# Patient Record
Sex: FEMALE | Race: WHITE | NOT HISPANIC OR LATINO | Employment: OTHER | URBAN - METROPOLITAN AREA
[De-identification: names, ages, dates, MRNs, and addresses within clinical notes are randomized per-mention and may not be internally consistent; named-entity substitution may affect disease eponyms.]

---

## 2017-10-20 ENCOUNTER — ESTABLISHED COMPREHENSIVE EXAM (OUTPATIENT)
Dept: URBAN - METROPOLITAN AREA CLINIC 39 | Facility: CLINIC | Age: 82
End: 2017-10-20

## 2017-10-20 DIAGNOSIS — H43.813: ICD-10-CM

## 2017-10-20 DIAGNOSIS — Z96.1: ICD-10-CM

## 2017-10-20 DIAGNOSIS — H35.372: ICD-10-CM

## 2017-10-20 PROCEDURE — 1036F TOBACCO NON-USER: CPT

## 2017-10-20 PROCEDURE — 92014 COMPRE OPH EXAM EST PT 1/>: CPT

## 2017-10-20 PROCEDURE — G8785 BP SCRN NO PERF AT INTERVAL: HCPCS

## 2017-10-20 PROCEDURE — 92015 DETERMINE REFRACTIVE STATE: CPT

## 2017-10-20 PROCEDURE — G8428 CUR MEDS NOT DOCUMENT: HCPCS

## 2017-10-20 ASSESSMENT — VISUAL ACUITY
OD_CC: 20/25
OS_SC: 20/100
OD_SC: J8
OD_CC: J1
OU_SC: J2
OD_SC: 20/20
OS_CC: 20/30-2
OS_SC: J3

## 2017-10-20 ASSESSMENT — TONOMETRY
OS_IOP_MMHG: 10
OD_IOP_MMHG: 10

## 2018-02-13 ENCOUNTER — ESTABLISHED PATIENT (OUTPATIENT)
Dept: URBAN - METROPOLITAN AREA CLINIC 39 | Facility: CLINIC | Age: 83
End: 2018-02-13

## 2018-02-13 DIAGNOSIS — H35.3131: ICD-10-CM

## 2018-02-13 DIAGNOSIS — H35.372: ICD-10-CM

## 2018-02-13 DIAGNOSIS — H43.811: ICD-10-CM

## 2018-02-13 PROCEDURE — G8427 DOCREV CUR MEDS BY ELIG CLIN: HCPCS

## 2018-02-13 PROCEDURE — 1036F TOBACCO NON-USER: CPT

## 2018-02-13 PROCEDURE — 9222550 BILAT EXTENDED OPHTHALMOSCOPY, FIRST

## 2018-02-13 PROCEDURE — 2019F DILATED MACUL EXAM DONE: CPT

## 2018-02-13 PROCEDURE — 4177F TALK PT/CRGVR RE AREDS PREV: CPT

## 2018-02-13 PROCEDURE — 92014 COMPRE OPH EXAM EST PT 1/>: CPT

## 2018-02-13 PROCEDURE — 92134 CPTRZ OPH DX IMG PST SGM RTA: CPT

## 2018-02-13 ASSESSMENT — VISUAL ACUITY
OS_CC: 20/40+2
OD_CC: 20/20-1
OS_CC: J1
OD_CC: J1

## 2018-02-13 ASSESSMENT — TONOMETRY
OS_IOP_MMHG: 11
OD_IOP_MMHG: 10

## 2018-08-14 ENCOUNTER — ESTABLISHED COMPREHENSIVE EXAM (OUTPATIENT)
Dept: URBAN - METROPOLITAN AREA CLINIC 39 | Facility: CLINIC | Age: 83
End: 2018-08-14

## 2018-08-14 DIAGNOSIS — H35.363: ICD-10-CM

## 2018-08-14 DIAGNOSIS — H35.3131: ICD-10-CM

## 2018-08-14 DIAGNOSIS — H43.811: ICD-10-CM

## 2018-08-14 DIAGNOSIS — H35.372: ICD-10-CM

## 2018-08-14 PROCEDURE — G8785 BP SCRN NO PERF AT INTERVAL: HCPCS

## 2018-08-14 PROCEDURE — G8427 DOCREV CUR MEDS BY ELIG CLIN: HCPCS

## 2018-08-14 PROCEDURE — 2019F DILATED MACUL EXAM DONE: CPT

## 2018-08-14 PROCEDURE — 1036F TOBACCO NON-USER: CPT

## 2018-08-14 PROCEDURE — G9903 PT SCRN TBCO ID AS NON USER: HCPCS

## 2018-08-14 PROCEDURE — 92134 CPTRZ OPH DX IMG PST SGM RTA: CPT

## 2018-08-14 PROCEDURE — 4177F TALK PT/CRGVR RE AREDS PREV: CPT

## 2018-08-14 PROCEDURE — 9222650 BILAT EXTENDED OPHTHALMOSCOPY, F/U

## 2018-08-14 PROCEDURE — 92014 COMPRE OPH EXAM EST PT 1/>: CPT

## 2018-08-14 ASSESSMENT — VISUAL ACUITY
OD_CC: 20/30
OS_CC: 20/25

## 2018-08-14 ASSESSMENT — TONOMETRY
OS_IOP_MMHG: 12
OD_IOP_MMHG: 11

## 2018-12-26 ENCOUNTER — ESTABLISHED COMPREHENSIVE EXAM (OUTPATIENT)
Dept: URBAN - METROPOLITAN AREA CLINIC 39 | Facility: CLINIC | Age: 83
End: 2018-12-26

## 2018-12-26 DIAGNOSIS — H16.223: ICD-10-CM

## 2018-12-26 DIAGNOSIS — H43.811: ICD-10-CM

## 2018-12-26 DIAGNOSIS — H35.363: ICD-10-CM

## 2018-12-26 DIAGNOSIS — M35.00: ICD-10-CM

## 2018-12-26 DIAGNOSIS — H35.372: ICD-10-CM

## 2018-12-26 DIAGNOSIS — H35.3131: ICD-10-CM

## 2018-12-26 PROCEDURE — 2019F DILATED MACUL EXAM DONE: CPT

## 2018-12-26 PROCEDURE — 92015 DETERMINE REFRACTIVE STATE: CPT

## 2018-12-26 PROCEDURE — G9903 PT SCRN TBCO ID AS NON USER: HCPCS

## 2018-12-26 PROCEDURE — 1036F TOBACCO NON-USER: CPT

## 2018-12-26 PROCEDURE — 92014 COMPRE OPH EXAM EST PT 1/>: CPT

## 2018-12-26 PROCEDURE — G8427 DOCREV CUR MEDS BY ELIG CLIN: HCPCS

## 2018-12-26 PROCEDURE — 4177F TALK PT/CRGVR RE AREDS PREV: CPT

## 2018-12-26 ASSESSMENT — TONOMETRY
OD_IOP_MMHG: 9
OS_IOP_MMHG: 10

## 2018-12-26 ASSESSMENT — VISUAL ACUITY
OS_SC: J3
OD_SC: 20/25-2
OU_SC: J2
OU_SC: 20/25-2
OD_SC: J3
OS_SC: 20/50

## 2019-09-10 ENCOUNTER — ESTABLISHED COMPREHENSIVE EXAM (OUTPATIENT)
Dept: URBAN - METROPOLITAN AREA CLINIC 39 | Facility: CLINIC | Age: 84
End: 2019-09-10

## 2019-09-10 DIAGNOSIS — H35.363: ICD-10-CM

## 2019-09-10 DIAGNOSIS — H43.811: ICD-10-CM

## 2019-09-10 DIAGNOSIS — H35.3131: ICD-10-CM

## 2019-09-10 DIAGNOSIS — H35.372: ICD-10-CM

## 2019-09-10 PROCEDURE — 92134 CPTRZ OPH DX IMG PST SGM RTA: CPT

## 2019-09-10 PROCEDURE — 92014 COMPRE OPH EXAM EST PT 1/>: CPT

## 2019-09-10 PROCEDURE — 9222650 BILAT EXTENDED OPHTHALMOSCOPY, F/U

## 2019-09-10 ASSESSMENT — TONOMETRY
OD_IOP_MMHG: 10
OS_IOP_MMHG: 10

## 2019-09-10 ASSESSMENT — VISUAL ACUITY
OS_SC: 20/70-2
OD_SC: 20/20
OS_PH: 20/40-2

## 2020-07-28 ENCOUNTER — ESTABLISHED COMPREHENSIVE EXAM (OUTPATIENT)
Dept: URBAN - METROPOLITAN AREA CLINIC 39 | Facility: CLINIC | Age: 85
End: 2020-07-28

## 2020-07-28 DIAGNOSIS — H01.02A: ICD-10-CM

## 2020-07-28 DIAGNOSIS — H01.02B: ICD-10-CM

## 2020-07-28 DIAGNOSIS — H35.372: ICD-10-CM

## 2020-07-28 DIAGNOSIS — H35.363: ICD-10-CM

## 2020-07-28 DIAGNOSIS — H35.3131: ICD-10-CM

## 2020-07-28 DIAGNOSIS — H43.811: ICD-10-CM

## 2020-07-28 DIAGNOSIS — H16.223: ICD-10-CM

## 2020-07-28 PROCEDURE — 92015 DETERMINE REFRACTIVE STATE: CPT

## 2020-07-28 PROCEDURE — 92014 COMPRE OPH EXAM EST PT 1/>: CPT

## 2020-07-28 ASSESSMENT — VISUAL ACUITY
OD_CC: J1+
OD_SC: J3
OS_SC: J2
OS_SC: 20/70
OS_CC: J1+
OU_CC: J1+
OD_SC: 20/25
OU_SC: J2
OU_SC: 20/25

## 2020-07-28 ASSESSMENT — TONOMETRY
OS_IOP_MMHG: 12
OD_IOP_MMHG: 11

## 2020-09-15 ENCOUNTER — ESTABLISHED COMPREHENSIVE EXAM (OUTPATIENT)
Dept: URBAN - METROPOLITAN AREA CLINIC 39 | Facility: CLINIC | Age: 85
End: 2020-09-15

## 2020-09-15 VITALS — HEIGHT: 55 IN

## 2020-09-15 DIAGNOSIS — H35.372: ICD-10-CM

## 2020-09-15 DIAGNOSIS — H35.3131: ICD-10-CM

## 2020-09-15 DIAGNOSIS — H35.363: ICD-10-CM

## 2020-09-15 DIAGNOSIS — H43.811: ICD-10-CM

## 2020-09-15 PROCEDURE — 92134 CPTRZ OPH DX IMG PST SGM RTA: CPT

## 2020-09-15 PROCEDURE — 92014 COMPRE OPH EXAM EST PT 1/>: CPT

## 2020-09-15 PROCEDURE — 92202 OPSCPY EXTND ON/MAC DRAW: CPT

## 2020-09-15 ASSESSMENT — TONOMETRY
OS_IOP_MMHG: 11
OD_IOP_MMHG: 10

## 2020-09-15 ASSESSMENT — VISUAL ACUITY
OS_SC: 20/70-2
OD_SC: 20/25+2
OS_PH: 20/50+2

## 2021-02-02 ENCOUNTER — ESTABLISHED COMPREHENSIVE EXAM (OUTPATIENT)
Dept: URBAN - METROPOLITAN AREA CLINIC 39 | Facility: CLINIC | Age: 86
End: 2021-02-02

## 2021-02-02 VITALS — HEIGHT: 55 IN

## 2021-02-02 DIAGNOSIS — H35.372: ICD-10-CM

## 2021-02-02 DIAGNOSIS — H35.363: ICD-10-CM

## 2021-02-02 DIAGNOSIS — H35.3131: ICD-10-CM

## 2021-02-02 DIAGNOSIS — H43.811: ICD-10-CM

## 2021-02-02 PROCEDURE — 92014 COMPRE OPH EXAM EST PT 1/>: CPT

## 2021-02-02 PROCEDURE — 92134 CPTRZ OPH DX IMG PST SGM RTA: CPT

## 2021-02-02 PROCEDURE — 92202 OPSCPY EXTND ON/MAC DRAW: CPT

## 2021-02-02 ASSESSMENT — VISUAL ACUITY
OS_SC: 20/60+2
OS_PH: 20/40-2
OD_SC: 20/25-1

## 2021-02-02 ASSESSMENT — TONOMETRY
OS_IOP_MMHG: 11
OD_IOP_MMHG: 10

## 2021-04-07 ENCOUNTER — ESTABLISHED COMPREHENSIVE EXAM (OUTPATIENT)
Dept: URBAN - METROPOLITAN AREA CLINIC 39 | Facility: CLINIC | Age: 86
End: 2021-04-07

## 2021-04-07 DIAGNOSIS — H01.02A: ICD-10-CM

## 2021-04-07 DIAGNOSIS — Z98.890: ICD-10-CM

## 2021-04-07 DIAGNOSIS — H35.3131: ICD-10-CM

## 2021-04-07 DIAGNOSIS — H04.123: ICD-10-CM

## 2021-04-07 DIAGNOSIS — H16.223: ICD-10-CM

## 2021-04-07 DIAGNOSIS — H35.363: ICD-10-CM

## 2021-04-07 DIAGNOSIS — H01.02B: ICD-10-CM

## 2021-04-07 DIAGNOSIS — H43.811: ICD-10-CM

## 2021-04-07 DIAGNOSIS — Z96.1: ICD-10-CM

## 2021-04-07 DIAGNOSIS — H35.372: ICD-10-CM

## 2021-04-07 PROCEDURE — 92014 COMPRE OPH EXAM EST PT 1/>: CPT

## 2021-04-07 PROCEDURE — 68761S PUNCTUM PLUG / SILICONE,EACH

## 2021-04-07 PROCEDURE — A4263 PERMANENT TEAR DUCT PLUG: HCPCS

## 2021-04-07 PROCEDURE — 92015 DETERMINE REFRACTIVE STATE: CPT

## 2021-04-07 RX ORDER — MINERAL OIL 2; 2 MG/.4ML; MG/.4ML: 1 EMULSION OPHTHALMIC

## 2021-04-07 ASSESSMENT — KERATOMETRY
OS_AXISANGLE_DEGREES: 3
OD_K2POWER_DIOPTERS: 47.00
OD_AXISANGLE2_DEGREES: 80
OS_K1POWER_DIOPTERS: 46.50
OS_K2POWER_DIOPTERS: 47.75
OD_AXISANGLE_DEGREES: 170
OS_AXISANGLE2_DEGREES: 93
OD_K1POWER_DIOPTERS: 46.50

## 2021-04-07 ASSESSMENT — VISUAL ACUITY
OU_SC: J1
OS_SC: 20/60
OD_SC: J3
OD_CC: J1+
OS_CC: J1
OU_CC: J1+
OU_SC: 20/25
OD_SC: 20/25+1
OS_SC: J1

## 2021-04-07 ASSESSMENT — TONOMETRY
OD_IOP_MMHG: 11
OS_IOP_MMHG: 13

## 2021-11-02 NOTE — PATIENT DISCUSSION
The upper eyelid margin in ptosis obstructs the visual axis causing impairment of the peripheral visual field which improves with taping.

## 2021-11-02 NOTE — PATIENT DISCUSSION
We have discussed the risks and benefits of the surgery at length as well as the location of the incision and the recovery process.

## 2021-11-02 NOTE — PATIENT DISCUSSION
The patient is aware of risk of prolonged redness from laser resurfacing, which may last up to 6 months in normal cases.

## 2021-11-02 NOTE — PATIENT DISCUSSION
The patient understands the surgery, has had all questions answered and desires to proceed with the surgery as explained.

## 2021-11-02 NOTE — PATIENT DISCUSSION
The patient understands this discussion, has had all questions answered and desires to proceed with blepharoplasty surgery as explained.

## 2021-11-02 NOTE — PATIENT DISCUSSION
Discussed risks and benefits of a transconjunctival lower eyelid blepharoplasty with laser resurfacing.